# Patient Record
Sex: MALE | Race: BLACK OR AFRICAN AMERICAN | Employment: UNEMPLOYED | URBAN - METROPOLITAN AREA
[De-identification: names, ages, dates, MRNs, and addresses within clinical notes are randomized per-mention and may not be internally consistent; named-entity substitution may affect disease eponyms.]

---

## 2024-11-19 ENCOUNTER — HOSPITAL ENCOUNTER (EMERGENCY)
Facility: HOSPITAL | Age: 59
Discharge: HOME | End: 2024-11-22
Attending: STUDENT IN AN ORGANIZED HEALTH CARE EDUCATION/TRAINING PROGRAM
Payer: MEDICAID

## 2024-11-19 DIAGNOSIS — R45.850 HOMICIDAL IDEATIONS: Primary | ICD-10-CM

## 2024-11-19 DIAGNOSIS — F10.920 ACUTE ALCOHOLIC INTOXICATION WITHOUT COMPLICATION (HCC): ICD-10-CM

## 2024-11-19 LAB
ALBUMIN SERPL-MCNC: 2.7 G/DL (ref 3.5–5)
ALBUMIN/GLOB SERPL: 0.5 (ref 1.1–2.2)
ALP SERPL-CCNC: 182 U/L (ref 45–117)
ALT SERPL-CCNC: 19 U/L (ref 12–78)
AMPHET UR QL SCN: NEGATIVE
ANION GAP SERPL CALC-SCNC: 16 MMOL/L (ref 2–12)
APAP SERPL-MCNC: <2 UG/ML (ref 10–30)
AST SERPL-CCNC: 24 U/L (ref 15–37)
BARBITURATES UR QL SCN: NEGATIVE
BASOPHILS # BLD: 0 K/UL (ref 0–0.1)
BASOPHILS NFR BLD: 0 % (ref 0–1)
BENZODIAZ UR QL: NEGATIVE
BILIRUB SERPL-MCNC: 0.2 MG/DL (ref 0.2–1)
BUN SERPL-MCNC: 23 MG/DL (ref 6–20)
BUN/CREAT SERPL: 8 (ref 12–20)
CALCIUM SERPL-MCNC: 8.8 MG/DL (ref 8.5–10.1)
CANNABINOIDS UR QL SCN: NEGATIVE
CHLORIDE SERPL-SCNC: 103 MMOL/L (ref 97–108)
CO2 SERPL-SCNC: 23 MMOL/L (ref 21–32)
COCAINE UR QL SCN: NEGATIVE
CREAT SERPL-MCNC: 2.83 MG/DL (ref 0.7–1.3)
DIFFERENTIAL METHOD BLD: ABNORMAL
EOSINOPHIL # BLD: 0 K/UL (ref 0–0.4)
EOSINOPHIL NFR BLD: 0 % (ref 0–7)
ERYTHROCYTE [DISTWIDTH] IN BLOOD BY AUTOMATED COUNT: 22.9 % (ref 11.5–14.5)
ETHANOL SERPL-MCNC: 417 MG/DL (ref 0–0.08)
GLOBULIN SER CALC-MCNC: 5.7 G/DL (ref 2–4)
GLUCOSE SERPL-MCNC: 156 MG/DL (ref 65–100)
HCT VFR BLD AUTO: 27.6 % (ref 36.6–50.3)
HGB BLD-MCNC: 8.5 G/DL (ref 12.1–17)
IMM GRANULOCYTES # BLD AUTO: 0 K/UL (ref 0–0.04)
IMM GRANULOCYTES NFR BLD AUTO: 0 % (ref 0–0.5)
LYMPHOCYTES # BLD: 1.3 K/UL (ref 0.8–3.5)
LYMPHOCYTES NFR BLD: 23 % (ref 12–49)
Lab: NORMAL
MAGNESIUM SERPL-MCNC: 1.7 MG/DL (ref 1.6–2.4)
MCH RBC QN AUTO: 29.4 PG (ref 26–34)
MCHC RBC AUTO-ENTMCNC: 30.8 G/DL (ref 30–36.5)
MCV RBC AUTO: 95.5 FL (ref 80–99)
METHADONE UR QL: NEGATIVE
MONOCYTES # BLD: 0.3 K/UL (ref 0–1)
MONOCYTES NFR BLD: 5 % (ref 5–13)
NEUTS SEG # BLD: 4 K/UL (ref 1.8–8)
NEUTS SEG NFR BLD: 72 % (ref 32–75)
NRBC # BLD: 0 K/UL (ref 0–0.01)
NRBC BLD-RTO: 0 PER 100 WBC
OPIATES UR QL: NEGATIVE
PCP UR QL: NEGATIVE
PLATELET # BLD AUTO: 121 K/UL (ref 150–400)
PMV BLD AUTO: 8.9 FL (ref 8.9–12.9)
POTASSIUM SERPL-SCNC: 4 MMOL/L (ref 3.5–5.1)
PROT SERPL-MCNC: 8.4 G/DL (ref 6.4–8.2)
RBC # BLD AUTO: 2.89 M/UL (ref 4.1–5.7)
RBC MORPH BLD: ABNORMAL
SALICYLATES SERPL-MCNC: <1.7 MG/DL (ref 2.8–20)
SODIUM SERPL-SCNC: 142 MMOL/L (ref 136–145)
WBC # BLD AUTO: 5.6 K/UL (ref 4.1–11.1)

## 2024-11-19 PROCEDURE — 80179 DRUG ASSAY SALICYLATE: CPT

## 2024-11-19 PROCEDURE — 80143 DRUG ASSAY ACETAMINOPHEN: CPT

## 2024-11-19 PROCEDURE — 80307 DRUG TEST PRSMV CHEM ANLYZR: CPT

## 2024-11-19 PROCEDURE — 99283 EMERGENCY DEPT VISIT LOW MDM: CPT

## 2024-11-19 PROCEDURE — 85025 COMPLETE CBC W/AUTO DIFF WBC: CPT

## 2024-11-19 PROCEDURE — 83735 ASSAY OF MAGNESIUM: CPT

## 2024-11-19 PROCEDURE — 82077 ASSAY SPEC XCP UR&BREATH IA: CPT

## 2024-11-19 PROCEDURE — 80053 COMPREHEN METABOLIC PANEL: CPT

## 2024-11-19 ASSESSMENT — PAIN - FUNCTIONAL ASSESSMENT: PAIN_FUNCTIONAL_ASSESSMENT: NONE - DENIES PAIN

## 2024-11-19 ASSESSMENT — LIFESTYLE VARIABLES
HOW MANY STANDARD DRINKS CONTAINING ALCOHOL DO YOU HAVE ON A TYPICAL DAY: 7 TO 9
HOW OFTEN DO YOU HAVE A DRINK CONTAINING ALCOHOL: 4 OR MORE TIMES A WEEK

## 2024-11-20 LAB
ANION GAP SERPL CALC-SCNC: 13 MMOL/L (ref 2–12)
BUN SERPL-MCNC: 28 MG/DL (ref 6–20)
BUN/CREAT SERPL: 10 (ref 12–20)
CALCIUM SERPL-MCNC: 8.4 MG/DL (ref 8.5–10.1)
CHLORIDE SERPL-SCNC: 104 MMOL/L (ref 97–108)
CO2 SERPL-SCNC: 23 MMOL/L (ref 21–32)
CREAT SERPL-MCNC: 2.79 MG/DL (ref 0.7–1.3)
ETHANOL SERPL-MCNC: 118 MG/DL (ref 0–0.08)
ETHANOL SERPL-MCNC: 241 MG/DL (ref 0–0.08)
ETHANOL SERPL-MCNC: 319 MG/DL (ref 0–0.08)
GLUCOSE SERPL-MCNC: 164 MG/DL (ref 65–100)
POTASSIUM SERPL-SCNC: 4.3 MMOL/L (ref 3.5–5.1)
SODIUM SERPL-SCNC: 140 MMOL/L (ref 136–145)

## 2024-11-20 PROCEDURE — 6370000000 HC RX 637 (ALT 250 FOR IP): Performed by: EMERGENCY MEDICINE

## 2024-11-20 PROCEDURE — 36415 COLL VENOUS BLD VENIPUNCTURE: CPT

## 2024-11-20 PROCEDURE — 82077 ASSAY SPEC XCP UR&BREATH IA: CPT

## 2024-11-20 PROCEDURE — 80048 BASIC METABOLIC PNL TOTAL CA: CPT

## 2024-11-20 RX ORDER — ATORVASTATIN CALCIUM 40 MG/1
20 TABLET, FILM COATED ORAL NIGHTLY
Status: DISCONTINUED | OUTPATIENT
Start: 2024-11-20 | End: 2024-11-22 | Stop reason: HOSPADM

## 2024-11-20 RX ORDER — ATORVASTATIN CALCIUM 20 MG/1
20 TABLET, FILM COATED ORAL DAILY
COMMUNITY

## 2024-11-20 RX ORDER — PANTOPRAZOLE SODIUM 40 MG/1
40 TABLET, DELAYED RELEASE ORAL
Status: DISCONTINUED | OUTPATIENT
Start: 2024-11-21 | End: 2024-11-22 | Stop reason: HOSPADM

## 2024-11-20 RX ORDER — LOPERAMIDE HYDROCHLORIDE 2 MG/1
2 CAPSULE ORAL 4 TIMES DAILY PRN
Status: DISCONTINUED | OUTPATIENT
Start: 2024-11-20 | End: 2024-11-22 | Stop reason: HOSPADM

## 2024-11-20 RX ORDER — METOPROLOL SUCCINATE 50 MG/1
50 TABLET, EXTENDED RELEASE ORAL DAILY
COMMUNITY

## 2024-11-20 RX ORDER — METOPROLOL SUCCINATE 25 MG/1
50 TABLET, EXTENDED RELEASE ORAL DAILY
Status: DISCONTINUED | OUTPATIENT
Start: 2024-11-21 | End: 2024-11-22 | Stop reason: HOSPADM

## 2024-11-20 RX ORDER — PANTOPRAZOLE SODIUM 20 MG/1
20 TABLET, DELAYED RELEASE ORAL DAILY
COMMUNITY

## 2024-11-20 RX ADMIN — LOPERAMIDE HYDROCHLORIDE 2 MG: 2 CAPSULE ORAL at 14:25

## 2024-11-20 RX ADMIN — LOPERAMIDE HYDROCHLORIDE 2 MG: 2 CAPSULE ORAL at 21:23

## 2024-11-20 RX ADMIN — APIXABAN 5 MG: 5 TABLET, FILM COATED ORAL at 21:23

## 2024-11-20 RX ADMIN — ATORVASTATIN CALCIUM 20 MG: 40 TABLET, FILM COATED ORAL at 21:23

## 2024-11-20 ASSESSMENT — PAIN - FUNCTIONAL ASSESSMENT: PAIN_FUNCTIONAL_ASSESSMENT: NONE - DENIES PAIN

## 2024-11-20 NOTE — ED NOTES
Shift change report given to JAVI Karimi.  Report included review of SBAR, accordion report, results review, orders, meds, ROS, and POC.  Pt due for a repeat ETOH serum level at 0800. All questions answered.  Transfer of care complete.

## 2024-11-20 NOTE — ED NOTES
[facility] called again about ECO. They say they were unaware. Gave them information and faxed information to them. They will call back for evaluation.

## 2024-11-20 NOTE — ED PROVIDER NOTES
[facility] EMERGENCY DEPT  EMERGENCY DEPARTMENT ENCOUNTER       Pt Name: Carlos Addison  MRN: [id number]  Birthdate 1965  Date of evaluation: 11/19/2024  Provider: Emmanuel Duffy MD   PCP: No primary care provider on file.  Note Started: 10:30 PM EST 11/19/24     CHIEF COMPLAINT       Chief Complaint   Patient presents with    Homicidal        HISTORY OF PRESENT ILLNESS: 1 or more elements      History From: Patient and Police / Law Enforcement  HPI Limitations: Intoxication     Carlos Addison is a 59 y.o. male who presents for homicidal ideations.  Patient does not provide much history.  He states he does not know why he is here.  Police officers escorted the patient into the ER under an E CO due to homicidal ideations.  He denies SI, denies auditory or visual hallucinations.  Patient denies substance use.  Reportedly was drinking alcohol today.  He denies pain or discomfort.  States he takes no prescription medications.       Nursing Notes were all reviewed and agreed with or any disagreements were addressed in the HPI.     REVIEW OF SYSTEMS      Review of Systems     Positives and Pertinent negatives as per HPI.    PAST HISTORY     Past Medical History:  No past medical history on file.      Past Surgical History:  No past surgical history on file.    Family History:  No family history on file.    Social History:       Allergies:  Allergies   Allergen Reactions    Lisinopril Other (See Comments)     Pt not able to describe reaction       CURRENT MEDICATIONS      Previous Medications    No medications on file       SCREENINGS               No data recorded        PHYSICAL EXAM      ED Triage Vitals [11/19/24 1907]   Encounter Vitals Group      /80      Systolic BP Percentile       Diastolic BP Percentile       Pulse 84      Respirations 18      Temp 97.7 °F (36.5 °C)      Temp Source Oral      SpO2 100 %      Weight - Scale 72.6 kg (160 lb)      Height 1.803 m (5' 11\")      Head Circumference       Peak Flow

## 2024-11-20 NOTE — ED TRIAGE NOTES
Pt reports he is \"homcidal,\" p has no plan.Sts he was in [facility] for murder and wants to hurt someone else. Pt alert and oriented. Pt is intoxicated and sts he's had a lot of alcohol.

## 2024-11-20 NOTE — ED NOTES
Per Zabrina with [facility], [employer] officer cleared to leave pt bedside.  Pt now voluntary with plan to continue monitoring in [facility] ED, repeat ETOH level at approx 9346-4608, then reassess for admit to medical floor vs BHU.  Officer informed he no longer has to stay at bedside.  Pt sleeping in room, call bell in reach, safety measures in place.

## 2024-11-20 NOTE — ED NOTES
Pt states to this RN that he spoke with a director that has placement for him elsewhere and is requesting to leave stating he is feeling better. Charge nurse made aware. Pt sitting at bedside in [facility].

## 2024-11-20 NOTE — ED NOTES
Pt had episode of incontince of urine.  Pt given basin with warm water and soap and washcloths.  Pt able to provide hygiene care independently.  Pt put on paper scrubs and gripper socks.  Pt then given turkey sandwich and apple juice as requested.

## 2024-11-20 NOTE — ED NOTES
Shift change report received from JOANA RN (off nurse).  Report included review of SBAR, accordion report, results review, orders, meds, ROS, and POC.  All questions answered.  Transfer of care complete.

## 2024-11-20 NOTE — ED NOTES
Pt brought into ED today by Heber SALAS under a ECO for CC of SI and HI.  Per PD report, pt called 911 today x2 reporting he had multiple weapons and wanted to use them.  After pt was convinced to exit house PD found there were no weapon in home.  Pt reports to this RN, \"I'm suicidal.  I've been locked up before for murder and I want to hurt someone again.\"  When pt asked if he wants to harm self he stated, \"No.  I want to hurt someone else.\"  Pt did not name a specific person he wants to hurt.  Pt reports ETOH intake today by stating, \"I've had a lot of alcohol.\"      Pt alert to self and place, did not answer date and is unaware of situation.  Pt drowsy AEB intermittent dosing while this RN at bedside asking questions.  Pt VSS and WNL during triage work-up.  Physically pt's assessment is grossly non-remarkable.  Pt appears intoxicated during interactions with staff.  Skin warm and dry, RR even and unlabored.  No acute s/sx of distress.      Emergency Department Nursing Plan of Care       The Nursing Plan of Care is developed from the Nursing assessment and Emergency Department Attending provider initial evaluation.  The plan of care may be reviewed in the “ED Provider note”.    The Plan of Care was developed with the following considerations:   Patient / Family readiness to learn indicated by:Refer to Medical chart in [facility]  Persons(s) to be included in education: Refer to Medical chart in [facility]  Barriers to Learning/Limitations:Normal    Signed     Jacob Sparks RN    11/19/2024   8:28 PM

## 2024-11-20 NOTE — ED NOTES
[facility] assessment of pt complete and updates provided to this RN.  Pt agreeable to staying voluntary.  Plan for admit to in-patient medical floor to monitor and assist medically with ETOH w/d.  Pt then to be evaluated by psych for possible in-patient BHU admit.

## 2024-11-20 NOTE — ED NOTES
Pt calm and cooperative with vitals and blood draw. Pt reports HI, denies SI. When asking pt how he was feeling pt stated, \"depressed\".  Pt provided w breakfast tray.

## 2024-11-20 NOTE — ED NOTES
Discussed recent ETOH level with Dr. Hunt and POC.  Plan to continue to monitor and repeat ETOH at 0800.  Goal is to get ETOH level below 200 and then assess for admit to either medical or BHU.  This RN in at bedside to update pt on POC, pt sleeping at this time.  Will provide updates to pt when he wakes next.  Safety measures in place.

## 2024-11-20 NOTE — BSMART NOTE
9:15 am: Called [facility]. Per crisis clinician, they are not aware of any cases in the ER as cases are all CTC. They have no updates for any of the holds at this time.    Spoke with Renee in [facility], She reports that Room 10 is not their case and she is unaware of the patient.    1:50 pm: Patient facesheet, notes, and labs faxed to [facility]. Charge is aware the ER providers note was not completed and unable to be sent at this time.    2:40 pm: Contacted [facility] to make sure they have received fax. Clinician went to check fax and found the documentation. They will work on the bedsearch.    Nida Hernadez [facility]
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
## 2024-11-20 NOTE — ED NOTES
Pt provided w imodium. Pt calm and cooperative and stating he is feeling good and that he has been praying. Tech @ bedside to obtain blood work.
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
## 2024-11-21 PROCEDURE — 6370000000 HC RX 637 (ALT 250 FOR IP): Performed by: EMERGENCY MEDICINE

## 2024-11-21 RX ADMIN — APIXABAN 5 MG: 5 TABLET, FILM COATED ORAL at 08:15

## 2024-11-21 RX ADMIN — PANTOPRAZOLE SODIUM 40 MG: 40 TABLET, DELAYED RELEASE ORAL at 08:15

## 2024-11-21 RX ADMIN — LOPERAMIDE HYDROCHLORIDE 2 MG: 2 CAPSULE ORAL at 08:54

## 2024-11-21 RX ADMIN — ATORVASTATIN CALCIUM 20 MG: 40 TABLET, FILM COATED ORAL at 22:10

## 2024-11-21 RX ADMIN — APIXABAN 5 MG: 5 TABLET, FILM COATED ORAL at 22:10

## 2024-11-21 RX ADMIN — METOPROLOL SUCCINATE 50 MG: 25 TABLET, FILM COATED, EXTENDED RELEASE ORAL at 08:14

## 2024-11-21 NOTE — ED NOTES
Verbal shift change report given to Ambreen CALDWELL (oncoming nurse) by Catracho (offgoing nurse). Report included the following information Nurse Handoff Report, ED SBAR, MAR, and Recent Results.

## 2024-11-21 NOTE — PROGRESS NOTES
[facility] Admission Pharmacy Medication Reconciliation    Information obtained from: RxQuery refill history  RxQuery data available1:Yes    Comments/recommendations:  Did not interview patient  Unable to determine medication compliance nor last doses  Medications listed below were last filled 10/26/24 at [facility]    Medication changes (since last review):  Added  Apixaban  Atorvastatin  Metoprolol succinate  pantoprazole     [facility] reflects medications filled and processed through the patient's insurance, however                this data does NOT capture whether the medication was picked up or is currently being taken by the patient.     Prior to Admission Medications   Prescriptions Last Dose Informant   apixaban (ELIQUIS) 5 MG TABS tablet  Other   Sig: Take 1 tablet by mouth 2 times daily   atorvastatin (LIPITOR) 20 MG tablet  Other   Sig: Take 1 tablet by mouth daily   metoprolol succinate (TOPROL XL) 50 MG extended release tablet  Other   Sig: Take 1 tablet by mouth daily   pantoprazole (PROTONIX) 20 MG tablet  Other   Sig: Take 1 tablet by mouth daily         Thank you,  Tiffany Fleming, PharmD, BCPS

## 2024-11-21 NOTE — ED NOTES
This nurse went to re-assess pt. Offered assistance for toileting or hygiene at this time. Provided opportunity for snack nourishment or PO fluid hydration. Pt is up-to-date on plan of care. No pain interventions required at this time. Warm blanket offered, call bell within reach, safety precautions in place, bed locked and in the lowest position.

## 2024-11-21 NOTE — ED NOTES
Pt requesting cell phone to talk to brother. Pt provided with phone. Officer still at bedside. Pt in NAD, RR even and unlabored.

## 2024-11-21 NOTE — ED NOTES
Pt out of room and expressed desire to leave.  [facility] contacted and staff informed that [facility] in process of obtaining TDO.  TDO executed and started at 2141.  Pt informed.  [employer] notified and officer to report to bedside.

## 2024-11-21 NOTE — ED NOTES
Shift change report received from JAVI Karimi (off nurse).  Report included review of SBAR, accordion report, results review, orders, meds, ROS, and POC.  All questions answered.  Transfer of care complete.

## 2024-11-21 NOTE — ED NOTES
TDO paperwork faxed from [facility]. Waiting on [employer] to serve paperwork.

## 2024-11-21 NOTE — ED NOTES
Pt up at nurses station saying he was leaving ED that \"noone is helping him and its his right to be able to leave.\" Stated he's going to a program tomorrow and no longer needs to be here. This RN called [facility] who stated they would TDO him. This RN explained I can't force him to stay in the ER.

## 2024-11-21 NOTE — BSMART NOTE
BSMART Liaison Team Note     LOS:  1 Day 14 Hours     Patient goal(s) for today: take medications as prescribed, make needs known in an appropriate manner, \"I just want to leave\"  BSMART Liaison team focus/goals: assess needs, provide support and education, coordinate care      Progress note: Met face to face with patient in the ER with Dr doran and GENOVEVA outside of door. Patient reports he was ready to go and had a place lined up so he tried to leave. He reports that he left his room and the police came and told him he had to stay. Patient reports he has a phone call for potential placement as his rooming house was sold. He reports that he does not need to be here as he was drunk at the time. He reports wanting to get help in a [facility] program and that he plans to get MH help at [facility]. Patient denies SI, HI, and hallucinations. He denies need for any medications at this time.      Barriers to Discharge: [facility] TDO  Guns in the home: Patient reports he can not have guns on him due to being on parole but he has access to them     Outpatient provider(s):  [facility] for medical but no psych provider, trying to get set up with   Insurance info/prescription coverage: [facility] MEDICAID/[facility] COMMUNITY PLAN CARDINAL CARE      Diagnosis:   Past Medical History        Past Medical History:   Diagnosis Date    Hypertension                 Plan:  [facility] to continue bed search. Commitment hearing tomorrow. Bsmart Liaison to offer support services as needed.   Follow up Psych Consult placed? No .   Psychiatrist updated? Yes          Participating treatment team members: Nida Morgan, LPC, LSATP [facility], Dr. Emiliano Doran MD

## 2024-11-22 VITALS
DIASTOLIC BLOOD PRESSURE: 105 MMHG | HEART RATE: 74 BPM | TEMPERATURE: 98.5 F | SYSTOLIC BLOOD PRESSURE: 134 MMHG | WEIGHT: 160 LBS | RESPIRATION RATE: 17 BRPM | HEIGHT: 71 IN | OXYGEN SATURATION: 100 % | BODY MASS INDEX: 22.4 KG/M2

## 2024-11-22 PROCEDURE — 6370000000 HC RX 637 (ALT 250 FOR IP): Performed by: EMERGENCY MEDICINE

## 2024-11-22 RX ADMIN — APIXABAN 5 MG: 5 TABLET, FILM COATED ORAL at 08:02

## 2024-11-22 RX ADMIN — LOPERAMIDE HYDROCHLORIDE 2 MG: 2 CAPSULE ORAL at 08:02

## 2024-11-22 RX ADMIN — METOPROLOL SUCCINATE 50 MG: 25 TABLET, FILM COATED, EXTENDED RELEASE ORAL at 08:03

## 2024-11-22 RX ADMIN — PANTOPRAZOLE SODIUM 40 MG: 40 TABLET, DELAYED RELEASE ORAL at 08:03

## 2024-11-22 NOTE — PROGRESS NOTES
Met face to face with patient with Dr Doran present and [employer] outside of room.    [facility] crisis reports that patient is still a bed search. Liza reports that the hearing is scheduled for Monday as someone from the ER requested that this morning. Explained that Dr Doran felt the patient could be released at his hearing as he had a very high alcohol level initially and denied wanting to hurt other or himself since he sobered. Liza reports she will see if she can get the team to see the patient but at this time the hearing team has scheduled the patient for Monday.    Met with hearing team in the ER. Explained that patient has been calm and cooperative, working on future housing plans, plans to go to [facility] Monday for BH intake and has a list of MH and SA resources. Patient has been calm and cooperative and future focused. Hearing team to see patient.    Patient was dismissed at his hearing.    Full note to follow.    Nida Hernadez LPC LSATP [facility]

## 2024-11-22 NOTE — ED NOTES
Discharge instructions were given to the patient by Ambreen CALDWELL. The patient left the Emergency Department ambulatory, alert and oriented and in no acute distress with 0 prescriptions. The patient was encouraged to call or return to the ED for worsening issues or problems and was encouraged to schedule a follow up appointment for continuing care. The patient verbalized understanding of discharge instructions and prescriptions, all questions were answered. The patient has no further concerns at this time.

## 2024-11-22 NOTE — ED NOTES
Verbal shift change report given to mAbreen RN (oncoming nurse) by Shruti RN (offgoing nurse). Report included the following information Nurse Handoff Report, ED SBAR, MAR, and Recent Results.
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
## 2024-11-22 NOTE — BSMART NOTE
[facility] Liaison Team Note     LOS:  0     Patient goal(s) for today: take medications as prescribed, make needs known in an appropriate manner, \"I have a plan\"  [facility] Liaison team focus/goals: assess needs, provide support and education, coordinate care    Progress note: Met face to face with patient with Dr Espinoza seals and [employer] outside of room. Patient reports he is continuing to do well. He reports he is ready to go home and has continued to work on coordinating with his contact about placement. He showed this writer a text conversation with [facility] team who is helping the patient with finding a new place to live when the [facility] is officially sold on December 1. Patient has multiple places to consider and is strongly considering a place on [address]. Patient reports he is sleeping and eating well. He denies SI, HI, and hallucinations. He reports that he has a plan to go Monday (backup plan Wednesday) to [facility] to complete his BH intake. He reports being open to going to AA meetings and outpatient SA treatment. Patient offered resource list for MH and SA providers as well as the [facility] hotline flyer. Patient reports that he is excited to go home and start looking at the places he has been seeing online and interviewing for. He reports he is hopeful to have a plan for December 1 by next week. Patient denies any needs. He was reading the resources to come up with a plan to present to the hearing team when they see him today. Discussed with patient his alcohol use was a big part of the reason he was here. He reports he can not keep drinking because he does not want to do this again.     [facility] crisis reports that patient is still a bed search. Liza reports that the hearing is scheduled for Monday as someone from the ER requested that this morning. Explained that Dr Doran felt the patient could be released at his hearing as he had a very high alcohol level initially and denied wanting

## 2025-02-28 ENCOUNTER — HOSPITAL ENCOUNTER (EMERGENCY)
Facility: HOSPITAL | Age: 60
Discharge: HOME | End: 2025-02-28
Attending: EMERGENCY MEDICINE
Payer: MEDICAID

## 2025-02-28 VITALS
TEMPERATURE: 98.1 F | OXYGEN SATURATION: 100 % | HEART RATE: 76 BPM | RESPIRATION RATE: 18 BRPM | BODY MASS INDEX: 22.32 KG/M2 | SYSTOLIC BLOOD PRESSURE: 108 MMHG | DIASTOLIC BLOOD PRESSURE: 86 MMHG | HEIGHT: 71 IN

## 2025-02-28 DIAGNOSIS — R60.0 BILATERAL LEG EDEMA: Primary | ICD-10-CM

## 2025-02-28 DIAGNOSIS — I87.2 VENOUS INSUFFICIENCY: ICD-10-CM

## 2025-02-28 DIAGNOSIS — N18.9 CHRONIC KIDNEY DISEASE, UNSPECIFIED CKD STAGE: ICD-10-CM

## 2025-02-28 LAB
ANION GAP SERPL CALC-SCNC: 7 MMOL/L (ref 2–12)
BASOPHILS # BLD: 0.02 K/UL (ref 0–0.1)
BASOPHILS NFR BLD: 0.4 % (ref 0–1)
BUN SERPL-MCNC: 24 MG/DL (ref 6–20)
BUN/CREAT SERPL: 8 (ref 12–20)
CALCIUM SERPL-MCNC: 7.8 MG/DL (ref 8.5–10.1)
CHLORIDE SERPL-SCNC: 111 MMOL/L (ref 97–108)
CO2 SERPL-SCNC: 24 MMOL/L (ref 21–32)
CREAT SERPL-MCNC: 3.02 MG/DL (ref 0.7–1.3)
DIFFERENTIAL METHOD BLD: ABNORMAL
EOSINOPHIL # BLD: 0.03 K/UL (ref 0–0.4)
EOSINOPHIL NFR BLD: 0.6 % (ref 0–7)
ERYTHROCYTE [DISTWIDTH] IN BLOOD BY AUTOMATED COUNT: 14.8 % (ref 11.5–14.5)
GLUCOSE SERPL-MCNC: 121 MG/DL (ref 65–100)
HCT VFR BLD AUTO: 25.6 % (ref 36.6–50.3)
HGB BLD-MCNC: 8 G/DL (ref 12.1–17)
IMM GRANULOCYTES # BLD AUTO: 0.01 K/UL (ref 0–0.04)
IMM GRANULOCYTES NFR BLD AUTO: 0.2 % (ref 0–0.5)
LYMPHOCYTES # BLD: 2.2 K/UL (ref 0.8–3.5)
LYMPHOCYTES NFR BLD: 47 % (ref 12–49)
MCH RBC QN AUTO: 30.9 PG (ref 26–34)
MCHC RBC AUTO-ENTMCNC: 31.3 G/DL (ref 30–36.5)
MCV RBC AUTO: 98.8 FL (ref 80–99)
MONOCYTES # BLD: 0.42 K/UL (ref 0–1)
MONOCYTES NFR BLD: 9 % (ref 5–13)
NEUTS SEG # BLD: 2 K/UL (ref 1.8–8)
NEUTS SEG NFR BLD: 42.8 % (ref 32–75)
NRBC # BLD: 0 K/UL (ref 0–0.01)
NRBC BLD-RTO: 0 PER 100 WBC
PLATELET # BLD AUTO: 199 K/UL (ref 150–400)
PMV BLD AUTO: 9.4 FL (ref 8.9–12.9)
POTASSIUM SERPL-SCNC: 4.5 MMOL/L (ref 3.5–5.1)
RBC # BLD AUTO: 2.59 M/UL (ref 4.1–5.7)
SODIUM SERPL-SCNC: 142 MMOL/L (ref 136–145)
WBC # BLD AUTO: 4.7 K/UL (ref 4.1–11.1)

## 2025-02-28 PROCEDURE — 99283 EMERGENCY DEPT VISIT LOW MDM: CPT

## 2025-02-28 PROCEDURE — 85025 COMPLETE CBC W/AUTO DIFF WBC: CPT

## 2025-02-28 PROCEDURE — 36415 COLL VENOUS BLD VENIPUNCTURE: CPT

## 2025-02-28 PROCEDURE — 80048 BASIC METABOLIC PNL TOTAL CA: CPT

## 2025-02-28 RX ORDER — FUROSEMIDE 40 MG/1
40 TABLET ORAL DAILY
Qty: 5 TABLET | Refills: 0 | Status: SHIPPED | OUTPATIENT
Start: 2025-02-28 | End: 2025-03-05

## 2025-02-28 ASSESSMENT — PAIN SCALES - GENERAL: PAINLEVEL_OUTOF10: 6

## 2025-02-28 ASSESSMENT — PAIN DESCRIPTION - LOCATION: LOCATION: ANKLE

## 2025-02-28 ASSESSMENT — PAIN DESCRIPTION - ORIENTATION: ORIENTATION: RIGHT;LEFT

## 2025-02-28 ASSESSMENT — PAIN - FUNCTIONAL ASSESSMENT: PAIN_FUNCTIONAL_ASSESSMENT: 0-10

## 2025-02-28 NOTE — ED NOTES
Pt presents ambulatory to ED complaining of bilateral ankle edema. Pt reports pain has been intermittent for the past year. Pt has taken a diuretic previously prescribed by PCP Pt is alert and oriented x 4, RR even and unlabored, skin is warm and dry. Assesment completed and pt updated on plan of care.       Emergency Department Nursing Plan of Care       The Nursing Plan of Care is developed from the Nursing assessment and Emergency Department Attending provider initial evaluation.  The plan of care may be reviewed in the “ED Provider note”.    The Plan of Care was developed with the following considerations:   Patient / Family readiness to learn indicated by:verbalized understanding  Persons(s) to be included in education: patient  Barriers to Learning/Limitations:None    Signed     Aliyah Meredith RN    2/28/2025   4:01 AM

## 2025-02-28 NOTE — DISCHARGE INSTRUCTIONS
-- AVOID SALT IN YOUR DIET AS WE DISCUSSED. THIS WILL HELP WITH YOUR LEG SWELLING  -- TAKE THE FLUID PILL DAILY FOR 5 DAYS  -- YOU HAVE CHRONIC KIDNEY DISEASE AND A FOLLOW UP TO GET THE KIDNEY FUNCTION CHECKED AGAIN IS VERY IMPORTANT. CALL YOUR DOCTOR TODAY TO SCHEDULE A VISIT AS SOON AS POSSIBLE  -- DAILY COMPRESSION STOCKINGS AND ELEVATE LEGS AS WE DISCUSSED

## 2025-02-28 NOTE — ED NOTES
Security speaking to patient. Pt yelling in waiting room saying \"get me a uber you're keeping me hostage here!\"

## 2025-02-28 NOTE — ED NOTES
Pt asking for a uber home. Stated he had to be seen in order to get a medicaid ride home. Pt cussing at monitor stating \" I dont need to be seen. Dammit I'm refusing I want to go to the crib.\" Pt stormed away from ER doorbell.

## 2025-02-28 NOTE — ED NOTES
Discharge instructions were given to the patient by Aliyah CALDWELL.     The patient left the Emergency Department alert and oriented and in no acute distress with 1 prescriptions. The patient was encouraged to call or return to the ED for worsening issues or problems and was encouraged to schedule a follow up appointment for continuing care.     Ambulation assessment completed before discharge.  Pt left Emergency Department ambulating at baseline with no ortho devices  Ortho device education: none    The patient verbalized understanding of discharge instructions and prescriptions, all questions were answered. The patient has no further concerns at this time.

## 2025-02-28 NOTE — ED NOTES
Patient walked out of ED after being informed that we would not be calling him a cab ride home. Pt was informed that he could get a ride on the bus Pt continued to curse at staff and security.

## 2025-03-18 ASSESSMENT — ENCOUNTER SYMPTOMS
COLOR CHANGE: 0
COUGH: 0
ABDOMINAL PAIN: 0
VOMITING: 0
BACK PAIN: 0
SHORTNESS OF BREATH: 0
NAUSEA: 0